# Patient Record
Sex: MALE | URBAN - METROPOLITAN AREA
[De-identification: names, ages, dates, MRNs, and addresses within clinical notes are randomized per-mention and may not be internally consistent; named-entity substitution may affect disease eponyms.]

---

## 2024-11-22 ENCOUNTER — OFFICE VISIT (OUTPATIENT)
Dept: URGENT CARE | Age: 19
End: 2024-11-22
Payer: COMMERCIAL

## 2024-11-22 VITALS
WEIGHT: 170 LBS | DIASTOLIC BLOOD PRESSURE: 75 MMHG | HEART RATE: 45 BPM | TEMPERATURE: 97.5 F | RESPIRATION RATE: 16 BRPM | SYSTOLIC BLOOD PRESSURE: 123 MMHG | OXYGEN SATURATION: 97 %
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
## 2024-11-22 DIAGNOSIS — R00.1 BRADYCARDIA: ICD-10-CM

## 2024-11-22 ASSESSMENT — ENCOUNTER SYMPTOMS
ABDOMINAL PAIN: 0
COUGH: 0
FEVER: 0
BACK PAIN: 0
NECK STIFFNESS: 0
NECK PAIN: 0
WHEEZING: 0
HEADACHES: 0
WOUND: 0
SHORTNESS OF BREATH: 0
CHILLS: 0
NAUSEA: 0
SORE THROAT: 0
DIZZINESS: 0
VOMITING: 0

## 2024-11-22 NOTE — LETTER
November 22, 2024     Patient: Garth Ott   YOB: 2005   Date of Visit: 11/22/2024       To Whom it May Concern:    Garth Ott was seen in my clinic on 11/22/2024. He  athletic activity immediately with no restrictions .    If you have any questions or concerns, please don't hesitate to call.         Sincerely,          Denise Rutherford PA-C        CC: No Recipients

## 2024-11-22 NOTE — LETTER
November 22, 2024     Patient: Garth Ott   YOB: 2005   Date of Visit: 11/22/2024       To Whom it May Concern:    Garth tOt was seen in my clinic on 11/22/2024. He  can return to athletic activity immediately without restriction .    If you have any questions or concerns, please don't hesitate to call.         Sincerely,          Denise Rutherford PA-C        CC: No Recipients
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
